# Patient Record
Sex: FEMALE | Race: WHITE | NOT HISPANIC OR LATINO | Employment: FULL TIME | RURAL
[De-identification: names, ages, dates, MRNs, and addresses within clinical notes are randomized per-mention and may not be internally consistent; named-entity substitution may affect disease eponyms.]

---

## 2019-01-28 ENCOUNTER — OFFICE VISIT (OUTPATIENT)
Dept: FAMILY MEDICINE CLINIC | Facility: CLINIC | Age: 24
End: 2019-01-28

## 2019-01-28 VITALS
DIASTOLIC BLOOD PRESSURE: 80 MMHG | HEIGHT: 64 IN | HEART RATE: 96 BPM | OXYGEN SATURATION: 98 % | RESPIRATION RATE: 18 BRPM | SYSTOLIC BLOOD PRESSURE: 123 MMHG | WEIGHT: 105 LBS | BODY MASS INDEX: 17.93 KG/M2

## 2019-01-28 DIAGNOSIS — R53.83 FATIGUE, UNSPECIFIED TYPE: Primary | ICD-10-CM

## 2019-01-28 DIAGNOSIS — F41.1 GENERALIZED ANXIETY DISORDER: ICD-10-CM

## 2019-01-28 PROCEDURE — 99212 OFFICE O/P EST SF 10 MIN: CPT | Performed by: NURSE PRACTITIONER

## 2019-01-28 RX ORDER — ETONOGESTREL/ETHINYL ESTRADIOL .12-.015MG
RING, VAGINAL VAGINAL
COMMUNITY
Start: 2019-01-03 | End: 2019-12-09

## 2019-01-28 RX ORDER — BUSPIRONE HYDROCHLORIDE 10 MG/1
TABLET ORAL
Qty: 60 TABLET | Refills: 2 | Status: SHIPPED | OUTPATIENT
Start: 2019-01-28 | End: 2019-12-09 | Stop reason: SDUPTHER

## 2019-01-28 NOTE — PROGRESS NOTES
Chief Complaint   Patient presents with   • Anxiety        Subjective   Radha Fernandes is a 23 y.o.  female who presents today for anxiety concerns.    HPI:  She has a long standing history of anxiety and she has seen a counselor in the past with good results.  She is now accepting a  position in [employer] and she has had an increase in anxiety related to this.  She is concerned and feels that she may need some medication to help her with this adjustment.    Radha Fernandes  has no past medical history on file.    No Known Allergies    Current Outpatient Medications:   •  NUVARING 0.12-0.015 MG/24HR vaginal ring, , Disp: , Rfl:   History reviewed. No pertinent past medical history.  History reviewed. No pertinent surgical history.  Social History     Socioeconomic History   • Marital status: Single     Spouse name: Not on file   • Number of children: Not on file   • Years of education: Not on file   • Highest education level: Not on file   Tobacco Use   • Smoking status: Never Smoker   • Smokeless tobacco: Never Used   Substance and Sexual Activity   • Alcohol use: Yes     Comment: 2 drinks a week   • Drug use: No   • Sexual activity: Defer     Family History   Problem Relation Age of Onset   • Hypertension Mother    • Depression Mother    • Anxiety disorder Mother    • No Known Problems Father        Family history, surgical history, past medical history, Allergies and med's reviewed with patient today and updated in [employer] EMR.     ROS:  Review of Systems   Constitutional: Positive for fatigue.   HENT: Negative.    Eyes: Negative.    Respiratory: Negative.    Cardiovascular: Negative.    Gastrointestinal: Negative.    Endocrine: Negative.    Genitourinary: Negative.    Musculoskeletal: Negative.    Skin: Negative.    Allergic/Immunologic: Negative.    Neurological: Negative.    Hematological: Negative.    Psychiatric/Behavioral: The patient is nervous/anxious.        OBJECTIVE:  Vitals:  "   01/28/19 1513   BP: 123/80   BP Location: Left arm   Patient Position: Sitting   Cuff Size: Adult   Pulse: 96   Resp: 18   SpO2: 98%   Weight: 47.6 kg (105 lb)   Height: 162.6 cm (64\")     Physical Exam   Constitutional: She is oriented to person, place, and time. She appears well-developed and well-nourished.   HENT:   Head: Normocephalic and atraumatic.   Right Ear: External ear normal.   Left Ear: External ear normal.   Nose: Nose normal.   Mouth/Throat: Oropharynx is clear and moist.   Eyes: Conjunctivae and EOM are normal. Pupils are equal, round, and reactive to light.   Neck: Normal range of motion. Neck supple.   Cardiovascular: Normal rate, regular rhythm, normal heart sounds and intact distal pulses.   Pulmonary/Chest: Effort normal and breath sounds normal.   Abdominal: Soft. Bowel sounds are normal.   Musculoskeletal: Normal range of motion.   Neurological: She is alert and oriented to person, place, and time.   Skin: Skin is warm and dry.   Psychiatric: She has a normal mood and affect. Her behavior is normal. Judgment and thought content normal.   Nursing note and vitals reviewed.      ASSESSMENT/ PLAN:    Radha was seen today for anxiety.    Diagnoses and all orders for this visit:    Fatigue, unspecified type    Generalized anxiety disorder  -     busPIRone (BUSPAR) 10 MG tablet; Take 1/2 to 1 tablet TID PRN anxiety        Orders Placed Today:     New Medications Ordered This Visit   Medications   • busPIRone (BUSPAR) 10 MG tablet     Sig: Take 1/2 to 1 tablet TID PRN anxiety     Dispense:  60 tablet     Refill:  2        Management Plan:     An After Visit Summary was printed and given to the patient at discharge.    Follow-up: Return if symptoms worsen or fail to improve.    Radha Jaimes, APRN 1/28/2019 3:41 PM  This note was electronically signed.          "

## 2019-09-20 ENCOUNTER — APPOINTMENT (OUTPATIENT)
Dept: GENERAL RADIOLOGY | Facility: HOSPITAL | Age: 24
End: 2019-09-20

## 2019-09-20 ENCOUNTER — HOSPITAL ENCOUNTER (EMERGENCY)
Facility: HOSPITAL | Age: 24
Discharge: HOME | End: 2019-09-20
Attending: EMERGENCY MEDICINE | Admitting: EMERGENCY MEDICINE

## 2019-09-20 VITALS
DIASTOLIC BLOOD PRESSURE: 69 MMHG | HEART RATE: 69 BPM | RESPIRATION RATE: 14 BRPM | HEIGHT: 64 IN | BODY MASS INDEX: 16.73 KG/M2 | WEIGHT: 98 LBS | TEMPERATURE: 98 F | SYSTOLIC BLOOD PRESSURE: 114 MMHG | OXYGEN SATURATION: 99 %

## 2019-09-20 DIAGNOSIS — J40 BRONCHITIS: Primary | ICD-10-CM

## 2019-09-20 LAB
ALBUMIN SERPL-MCNC: 5.4 G/DL (ref 3.5–5.2)
ALBUMIN/GLOB SERPL: 1.7 G/DL
ALP SERPL-CCNC: 96 U/L (ref 39–117)
ALT SERPL W P-5'-P-CCNC: 9 U/L (ref 1–33)
ANION GAP SERPL CALCULATED.3IONS-SCNC: 19 MMOL/L (ref 5–15)
AST SERPL-CCNC: 15 U/L (ref 1–32)
BASOPHILS # BLD AUTO: 0.04 10*3/MM3 (ref 0–0.2)
BASOPHILS NFR BLD AUTO: 0.4 % (ref 0–1.5)
BILIRUB SERPL-MCNC: 0.7 MG/DL (ref 0.2–1.2)
BUN BLD-MCNC: 10 MG/DL (ref 6–20)
BUN/CREAT SERPL: 17.5 (ref 7–25)
CALCIUM SPEC-SCNC: 9.9 MG/DL (ref 8.6–10.5)
CHLORIDE SERPL-SCNC: 100 MMOL/L (ref 98–107)
CO2 SERPL-SCNC: 22 MMOL/L (ref 22–29)
CREAT BLD-MCNC: 0.57 MG/DL (ref 0.57–1)
D DIMER PPP FEU-MCNC: 0.42 MG/L (FEU) (ref 0–0.5)
D-LACTATE SERPL-SCNC: 0.8 MMOL/L (ref 0.5–2)
DEPRECATED RDW RBC AUTO: 38.5 FL (ref 37–54)
EOSINOPHIL # BLD AUTO: 0.05 10*3/MM3 (ref 0–0.4)
EOSINOPHIL NFR BLD AUTO: 0.5 % (ref 0.3–6.2)
ERYTHROCYTE [DISTWIDTH] IN BLOOD BY AUTOMATED COUNT: 12 % (ref 12.3–15.4)
GFR SERPL CREATININE-BSD FRML MDRD: 133 ML/MIN/1.73
GLOBULIN UR ELPH-MCNC: 3.2 GM/DL
GLUCOSE BLD-MCNC: 97 MG/DL (ref 65–99)
HCT VFR BLD AUTO: 42.6 % (ref 34–46.6)
HGB BLD-MCNC: 15 G/DL (ref 12–15.9)
IMM GRANULOCYTES # BLD AUTO: 0.02 10*3/MM3 (ref 0–0.05)
IMM GRANULOCYTES NFR BLD AUTO: 0.2 % (ref 0–0.5)
LYMPHOCYTES # BLD AUTO: 2.27 10*3/MM3 (ref 0.7–3.1)
LYMPHOCYTES NFR BLD AUTO: 22.2 % (ref 19.6–45.3)
MCH RBC QN AUTO: 31.1 PG (ref 26.6–33)
MCHC RBC AUTO-ENTMCNC: 35.2 G/DL (ref 31.5–35.7)
MCV RBC AUTO: 88.2 FL (ref 79–97)
MONOCYTES # BLD AUTO: 0.62 10*3/MM3 (ref 0.1–0.9)
MONOCYTES NFR BLD AUTO: 6.1 % (ref 5–12)
NEUTROPHILS # BLD AUTO: 7.21 10*3/MM3 (ref 1.7–7)
NEUTROPHILS NFR BLD AUTO: 70.6 % (ref 42.7–76)
NRBC BLD AUTO-RTO: 0 /100 WBC (ref 0–0.2)
PLATELET # BLD AUTO: 455 10*3/MM3 (ref 140–450)
PMV BLD AUTO: 9.7 FL (ref 6–12)
POTASSIUM BLD-SCNC: 3.6 MMOL/L (ref 3.5–5.2)
PROT SERPL-MCNC: 8.6 G/DL (ref 6–8.5)
RBC # BLD AUTO: 4.83 10*6/MM3 (ref 3.77–5.28)
SODIUM BLD-SCNC: 141 MMOL/L (ref 136–145)
WBC NRBC COR # BLD: 10.21 10*3/MM3 (ref 3.4–10.8)

## 2019-09-20 PROCEDURE — 87040 BLOOD CULTURE FOR BACTERIA: CPT | Performed by: EMERGENCY MEDICINE

## 2019-09-20 PROCEDURE — 85025 COMPLETE CBC W/AUTO DIFF WBC: CPT | Performed by: EMERGENCY MEDICINE

## 2019-09-20 PROCEDURE — 71046 X-RAY EXAM CHEST 2 VIEWS: CPT

## 2019-09-20 PROCEDURE — 99284 EMERGENCY DEPT VISIT MOD MDM: CPT

## 2019-09-20 PROCEDURE — 80053 COMPREHEN METABOLIC PANEL: CPT | Performed by: EMERGENCY MEDICINE

## 2019-09-20 PROCEDURE — 83605 ASSAY OF LACTIC ACID: CPT | Performed by: EMERGENCY MEDICINE

## 2019-09-20 PROCEDURE — 85379 FIBRIN DEGRADATION QUANT: CPT | Performed by: EMERGENCY MEDICINE

## 2019-09-20 RX ORDER — AZITHROMYCIN 250 MG/1
250 TABLET, FILM COATED ORAL DAILY
COMMUNITY
End: 2019-12-09

## 2019-09-20 RX ORDER — SODIUM CHLORIDE 0.9 % (FLUSH) 0.9 %
10 SYRINGE (ML) INJECTION AS NEEDED
Status: DISCONTINUED | OUTPATIENT
Start: 2019-09-20 | End: 2019-09-21 | Stop reason: HOSPADM

## 2019-09-20 RX ADMIN — SODIUM CHLORIDE 1000 ML: 9 INJECTION, SOLUTION INTRAVENOUS at 21:05

## 2019-09-21 NOTE — ED PROVIDER NOTES
Subjective   Patient complains of cough and congestion is been going on for about a week.  She says the cough is nonproductive but is deep and hacking.  She does not know of any definite fever but has felt chilled.  She has had some vomiting with this also.  She aches all over and generally feels bad.  She was seen at [facility] and diagnosed with bronchitis and put on a Z-Jamshid but has not gotten better and actually seems to gotten worse.  She says they did give her a shot of steroids and that seemed to help a little while but now she is feeling worse.  She is worried because she has vape THC over the last month or so.  She was not aware of the alerts about there being problems with that until now.  She has lost about 8 pounds also.  She had one syncopal episode earlier in the week of the restroom at [facility] but does not really know what precipitated it.  She is very scared more to be checked out.        History provided by:  Patient   used: No    Cough   Cough characteristics:  Dry  Sputum characteristics:  Unable to specify  Severity:  Moderate  Onset quality:  Gradual  Duration:  1 week  Timing:  Intermittent  Progression:  Unchanged  Chronicity:  New  Smoker: no (vapes)    Context: upper respiratory infection    Context: not animal exposure, not exposure to allergens, not fumes, not occupational exposure, not sick contacts, not smoke exposure, not weather changes and not with activity    Relieved by:  Nothing  Worsened by:  Nothing  Ineffective treatments: Antibiotics.  Associated symptoms: chest pain, chills, myalgias, shortness of breath and weight loss    Associated symptoms: no diaphoresis, no ear fullness, no ear pain, no eye discharge, no fever, no headaches, no rash, no rhinorrhea, no sinus congestion, no sore throat and no wheezing    Risk factors: no chemical exposure, no recent infection and no recent travel        Review of Systems   Constitutional: Positive for chills and  weight loss. Negative for diaphoresis and fever.   HENT: Negative for ear pain, rhinorrhea and sore throat.    Eyes: Negative.  Negative for discharge.   Respiratory: Positive for cough and shortness of breath. Negative for wheezing.    Cardiovascular: Positive for chest pain.   Gastrointestinal: Negative.    Genitourinary: Negative.    Musculoskeletal: Positive for myalgias.   Skin: Negative.  Negative for rash.   Neurological: Negative.  Negative for headaches.   Psychiatric/Behavioral: Negative.    All other systems reviewed and are negative.      Past Medical History:   Diagnosis Date   • Bronchitis        No Known Allergies    History reviewed. No pertinent surgical history.    Family History   Problem Relation Age of Onset   • Hypertension Mother    • Depression Mother    • Anxiety disorder Mother    • No Known Problems Father        Social History     Socioeconomic History   • Marital status: Single     Spouse name: Not on file   • Number of children: Not on file   • Years of education: Not on file   • Highest education level: Not on file   Tobacco Use   • Smoking status: Never Smoker   • Smokeless tobacco: Never Used   Substance and Sexual Activity   • Alcohol use: Yes     Comment: 2 drinks a week   • Drug use: No   • Sexual activity: Defer       Prior to Admission medications    Medication Sig Start Date End Date Taking? Authorizing Provider   azithromycin (ZITHROMAX) 250 MG tablet Take 250 mg by mouth Daily. Take 2 tablets the first day, then 1 tablet daily for 4 days.   Yes Ashia Powell MD   busPIRone (BUSPAR) 10 MG tablet Take 1/2 to 1 tablet TID PRN anxiety 1/28/19   Radha Jaimes APRN   NUVARING 0.12-0.015 MG/24HR vaginal ring  1/3/19   ProviderAshia MD       Medications   sodium chloride 0.9 % flush 10 mL (not administered)   sodium chloride 0.9 % bolus 1,000 mL (0 mL Intravenous Stopped 9/20/19 2135)       Vitals:    09/20/19 2228   BP:    Pulse: 69   Resp:    Temp:    SpO2:  99%         Objective   Physical Exam   Constitutional: She is oriented to person, place, and time. She appears well-developed and well-nourished.   HENT:   Head: Normocephalic and atraumatic.   Mouth/Throat: Oropharynx is clear and moist.   Neck: Normal range of motion. Neck supple.   Cardiovascular: Normal rate and regular rhythm.   Pulmonary/Chest: Effort normal and breath sounds normal.   Abdominal: Soft. Bowel sounds are normal.   Musculoskeletal: Normal range of motion.   Neurological: She is alert and oriented to person, place, and time.   Skin: Skin is warm and dry. Capillary refill takes less than 2 seconds.   Psychiatric: She has a normal mood and affect. Her behavior is normal.   Nursing note and vitals reviewed.      Procedures         Lab Results (last 24 hours)     Procedure Component Value Units Date/Time    CBC & Differential [[id number]] Collected:  09/20/19 2100    Specimen:  Blood Updated:  09/20/19 2114    Narrative:       The following orders were created for panel order CBC & Differential.  Procedure                               Abnormality         Status                     ---------                               -----------         ------                     CBC Auto Differential[[id number]]         Abnormal            Final result                 Please view results for these tests on the individual orders.    Comprehensive Metabolic Panel [[id number]]  (Abnormal) Collected:  09/20/19 2100    Specimen:  Blood Updated:  09/20/19 2129     Glucose 97 mg/dL      BUN 10 mg/dL      Creatinine 0.57 mg/dL      Sodium 141 mmol/L      Potassium 3.6 mmol/L      Chloride 100 mmol/L      CO2 22.0 mmol/L      Calcium 9.9 mg/dL      Total Protein 8.6 g/dL      Albumin 5.40 g/dL      ALT (SGPT) 9 U/L      AST (SGOT) 15 U/L      Alkaline Phosphatase 96 U/L      Total Bilirubin 0.7 mg/dL      eGFR Non African Amer 133 mL/min/1.73      Globulin 3.2 gm/dL      A/G Ratio 1.7 g/dL      BUN/Creatinine Ratio 17.5      Anion Gap 19.0 mmol/L     Narrative:       GFR Normal >60  Chronic Kidney Disease <60  Kidney Failure <15    D-dimer, Quantitative [[id number]]  (Normal) Collected:  09/20/19 2100    Specimen:  Blood Updated:  09/20/19 2129     D-Dimer, Quantitative 0.42 mg/L (FEU)     Narrative:       Reference Range is 0-0.50 mg/L FEU. However, results <0.50 mg/L FEU tends to rule out DVT or PE. Results >0.50 mg/L FEU are not useful in predicting absence or presence of DVT or PE.    Lactic Acid, Plasma [[id number]]  (Normal) Collected:  09/20/19 2100    Specimen:  Blood Updated:  09/20/19 2126     Lactate 0.8 mmol/L     CBC Auto Differential [[id number]]  (Abnormal) Collected:  09/20/19 2100    Specimen:  Blood Updated:  09/20/19 2114     WBC 10.21 10*3/mm3      RBC 4.83 10*6/mm3      Hemoglobin 15.0 g/dL      Hematocrit 42.6 %      MCV 88.2 fL      MCH 31.1 pg      MCHC 35.2 g/dL      RDW 12.0 %      RDW-SD 38.5 fl      MPV 9.7 fL      Platelets 455 10*3/mm3      Neutrophil % 70.6 %      Lymphocyte % 22.2 %      Monocyte % 6.1 %      Eosinophil % 0.5 %      Basophil % 0.4 %      Immature Grans % 0.2 %      Neutrophils, Absolute 7.21 10*3/mm3      Lymphocytes, Absolute 2.27 10*3/mm3      Monocytes, Absolute 0.62 10*3/mm3      Eosinophils, Absolute 0.05 10*3/mm3      Basophils, Absolute 0.04 10*3/mm3      Immature Grans, Absolute 0.02 10*3/mm3      nRBC 0.0 /100 WBC     Blood Culture With JOSE LUIS - Blood, Arm, Right [[id number]] Collected:  09/20/19 2100    Specimen:  Blood from Arm, Right Updated:  09/20/19 2111          XR Chest 2 View   Final Result   1. No radiographic evidence of acute cardiopulmonary process.           This report was finalized on 09/20/2019 21:23 by Dr. Louis Gipson MD.          ED Course  ED Course as of Sep 21 0012   Sat Sep 21, 2019   0011 I told the patient all of her testing here today looked okay.  There is no signs of a preinjury anything right now.  This appears to be a viral bronchitis.  I did tell her if she  felt worse or things change to come back let us recheck her.  She is discharged in stable condition.  [TR]      ED Course User Index  [TR] Dov Butler Jr., MD          MDM  Number of Diagnoses or Management Options  Bronchitis: new and requires workup     Amount and/or Complexity of Data Reviewed  Clinical lab tests: ordered and reviewed  Tests in the radiology section of CPT®: ordered and reviewed  Tests in the medicine section of CPT®: ordered and reviewed    Risk of Complications, Morbidity, and/or Mortality  Presenting problems: moderate  Diagnostic procedures: moderate  Management options: moderate    Patient Progress  Patient progress: stable      Final diagnoses:   Bronchitis          Dov Butler Jr., MD  09/21/19 0012

## 2019-09-25 LAB — BACTERIA SPEC AEROBE CULT: NORMAL

## 2019-12-09 ENCOUNTER — OFFICE VISIT (OUTPATIENT)
Dept: FAMILY MEDICINE CLINIC | Facility: CLINIC | Age: 24
End: 2019-12-09

## 2019-12-09 VITALS
SYSTOLIC BLOOD PRESSURE: 120 MMHG | HEIGHT: 64 IN | DIASTOLIC BLOOD PRESSURE: 82 MMHG | BODY MASS INDEX: 18.3 KG/M2 | OXYGEN SATURATION: 96 % | HEART RATE: 91 BPM | WEIGHT: 107.2 LBS

## 2019-12-09 DIAGNOSIS — F41.1 GENERALIZED ANXIETY DISORDER: ICD-10-CM

## 2019-12-09 DIAGNOSIS — Z30.011 ENCOUNTER FOR INITIAL PRESCRIPTION OF CONTRACEPTIVE PILLS: Primary | ICD-10-CM

## 2019-12-09 DIAGNOSIS — R63.5 WEIGHT GAIN: ICD-10-CM

## 2019-12-09 PROBLEM — F41.9 ANXIETY: Status: ACTIVE | Noted: 2019-12-09

## 2019-12-09 LAB
B-HCG UR QL: NEGATIVE
INTERNAL NEGATIVE CONTROL: NEGATIVE
INTERNAL POSITIVE CONTROL: POSITIVE
Lab: NORMAL

## 2019-12-09 PROCEDURE — 81025 URINE PREGNANCY TEST: CPT | Performed by: NURSE PRACTITIONER

## 2019-12-09 PROCEDURE — 99213 OFFICE O/P EST LOW 20 MIN: CPT | Performed by: NURSE PRACTITIONER

## 2019-12-09 RX ORDER — BUSPIRONE HYDROCHLORIDE 10 MG/1
TABLET ORAL
Qty: 60 TABLET | Refills: 2 | Status: SHIPPED | OUTPATIENT
Start: 2019-12-09 | End: 2021-05-06

## 2019-12-09 RX ORDER — CITALOPRAM 10 MG/1
10 TABLET ORAL DAILY
Qty: 30 TABLET | Refills: 5 | Status: SHIPPED | OUTPATIENT
Start: 2019-12-09 | End: 2020-07-24

## 2019-12-09 RX ORDER — NAPROXEN 500 MG/1
500 TABLET ORAL 2 TIMES DAILY
Refills: 0 | COMMUNITY
Start: 2019-11-06 | End: 2020-07-24

## 2019-12-09 RX ORDER — DROSPIRENONE AND ETHINYL ESTRADIOL 0.02-3(28)
1 KIT ORAL DAILY
Qty: 28 TABLET | Refills: 12 | Status: SHIPPED | OUTPATIENT
Start: 2019-12-09 | End: 2020-07-24

## 2019-12-09 NOTE — PROGRESS NOTES
Chief Complaint   Patient presents with   • Anxiety     recheck   • medication review        Subjective   Radha Fernandes is a 24 y.o.  female who presents today for medication review/anxiety.    HPI:  ANXIETY:  She states that at the beginning she only had social anxiety.  Now, she is to the point that she is having panic attacks.  The Buspar still works for her but she states it does not hold her until the next episode and she feels that it isn't strong enough.  BCP:  Patient no longer has the Nuvaring and wishes to restart oral contraceptives.  She states she used to take them but was not responsible enough to take them at the same time every day.  She feels that she would prefer this treatment method now.  LMP:  11/23/2019    Radha Fernandes  has a past medical history of Bronchitis.    No Known Allergies    Current Outpatient Medications:   •  busPIRone (BUSPAR) 10 MG tablet, Take 1/2 to 1 tablet TID PRN anxiety, Disp: 60 tablet, Rfl: 2  •  naproxen (NAPROSYN) 500 MG tablet, Take 500 mg by mouth 2 (Two) Times a Day., Disp: , Rfl: 0  •  citalopram (CeleXA) 10 MG tablet, Take 1 tablet by mouth Daily., Disp: 30 tablet, Rfl: 5  •  drospirenone-ethinyl estradiol (STANLEY,GIANVI) 3-0.02 MG per tablet, Take 1 tablet by mouth Daily., Disp: 28 tablet, Rfl: 12  Past Medical History:   Diagnosis Date   • Bronchitis      History reviewed. No pertinent surgical history.  Social History     Socioeconomic History   • Marital status: Single     Spouse name: Not on file   • Number of children: Not on file   • Years of education: Not on file   • Highest education level: Not on file   Tobacco Use   • Smoking status: Never Smoker   • Smokeless tobacco: Never Used   Substance and Sexual Activity   • Alcohol use: Yes     Comment: 2 drinks a week   • Drug use: No   • Sexual activity: Defer     Family History   Problem Relation Age of Onset   • Hypertension Mother    • Depression Mother    • Anxiety disorder Mother    • No Known  "Problems Father        Family history, surgical history, past medical history, Allergies and med's reviewed with patient today and updated in [facility] EMR.     ROS:  Review of Systems   Constitutional: Negative.  Negative for fatigue, fever and unexpected weight change.   HENT: Negative.  Negative for facial swelling, sore throat and trouble swallowing.    Eyes: Negative.  Negative for photophobia, discharge and visual disturbance.   Respiratory: Negative.  Negative for cough, chest tightness and shortness of breath.    Cardiovascular: Negative.  Negative for chest pain and palpitations.   Gastrointestinal: Negative.  Negative for abdominal pain, diarrhea, nausea and vomiting.   Endocrine: Negative.  Negative for polydipsia, polyphagia and polyuria.   Genitourinary: Negative.  Negative for dysuria, flank pain and frequency.   Musculoskeletal: Negative.  Negative for back pain, gait problem and neck pain.   Skin: Negative.  Negative for rash.   Allergic/Immunologic: Negative.    Neurological: Negative.  Negative for dizziness, light-headedness and headaches.   Hematological: Negative.    Psychiatric/Behavioral: Negative for self-injury and suicidal ideas. The patient is nervous/anxious.        OBJECTIVE:  Vitals:    12/09/19 1135   BP: 120/82   BP Location: Left arm   Patient Position: Sitting   Cuff Size: Adult   Pulse: 91   SpO2: 96%   Weight: 48.6 kg (107 lb 3.2 oz)   Height: 162.6 cm (64\")     Physical Exam   Constitutional: She is oriented to person, place, and time. She appears well-developed and well-nourished. No distress.   HENT:   Head: Normocephalic and atraumatic.   Eyes: Pupils are equal, round, and reactive to light. Conjunctivae and EOM are normal.   Neck: Normal range of motion. Neck supple.   Cardiovascular: Normal rate, regular rhythm, normal heart sounds and intact distal pulses.   No murmur heard.  Pulmonary/Chest: Effort normal and breath sounds normal. No respiratory distress.   Abdominal: Soft. " Bowel sounds are normal. She exhibits no distension. There is no tenderness.   Musculoskeletal: Normal range of motion. She exhibits no edema.   Neurological: She is alert and oriented to person, place, and time.   Skin: Skin is warm and dry. Capillary refill takes less than 2 seconds. She is not diaphoretic. No erythema.   Psychiatric: She has a normal mood and affect. Her behavior is normal. Judgment and thought content normal.   Nursing note and vitals reviewed.      ASSESSMENT/ PLAN:    Radha was seen today for anxiety and medication review.    Diagnoses and all orders for this visit:    Encounter for initial prescription of contraceptive pills  -     POCT pregnancy, urine  -     drospirenone-ethinyl estradiol (STANLEY,GIANVI) 3-0.02 MG per tablet; Take 1 tablet by mouth Daily.    Generalized anxiety disorder  -     citalopram (CeleXA) 10 MG tablet; Take 1 tablet by mouth Daily.  -     busPIRone (BUSPAR) 10 MG tablet; Take 1/2 to 1 tablet TID PRN anxiety    Weight gain        Orders Placed Today:     New Medications Ordered This Visit   Medications   • citalopram (CeleXA) 10 MG tablet     Sig: Take 1 tablet by mouth Daily.     Dispense:  30 tablet     Refill:  5   • busPIRone (BUSPAR) 10 MG tablet     Sig: Take 1/2 to 1 tablet TID PRN anxiety     Dispense:  60 tablet     Refill:  2   • drospirenone-ethinyl estradiol (STANLEY,GIANVI) 3-0.02 MG per tablet     Sig: Take 1 tablet by mouth Daily.     Dispense:  28 tablet     Refill:  12        Management Plan:     An After Visit Summary was printed and given to the patient at discharge.    Follow-up: Return in about 6 months (around 6/9/2020) for Next scheduled follow up.    Radha Jaimes, APRN 12/9/2019 12:03 PM  This note was electronically signed.

## 2020-07-24 ENCOUNTER — HOSPITAL ENCOUNTER (OUTPATIENT)
Dept: GENERAL RADIOLOGY | Facility: HOSPITAL | Age: 25
Discharge: HOME | End: 2020-07-24
Admitting: NURSE PRACTITIONER

## 2020-07-24 PROCEDURE — U0003 INFECTIOUS AGENT DETECTION BY NUCLEIC ACID (DNA OR RNA); SEVERE ACUTE RESPIRATORY SYNDROME CORONAVIRUS 2 (SARS-COV-2) (CORONAVIRUS DISEASE [COVID-19]), AMPLIFIED PROBE TECHNIQUE, MAKING USE OF HIGH THROUGHPUT TECHNOLOGIES AS DESCRIBED BY CMS-2020-01-R: HCPCS | Performed by: NURSE PRACTITIONER

## 2020-07-24 PROCEDURE — 71046 X-RAY EXAM CHEST 2 VIEWS: CPT

## 2020-07-28 ENCOUNTER — TELEMEDICINE (OUTPATIENT)
Dept: FAMILY MEDICINE CLINIC | Facility: CLINIC | Age: 25
End: 2020-07-28

## 2020-07-28 DIAGNOSIS — R53.83 FATIGUE, UNSPECIFIED TYPE: ICD-10-CM

## 2020-07-28 DIAGNOSIS — Z01.84 COVID-19 VIRUS ANTIBODY NEGATIVE: ICD-10-CM

## 2020-07-28 DIAGNOSIS — J45.21 MILD INTERMITTENT ASTHMATIC BRONCHITIS WITH ACUTE EXACERBATION: Primary | ICD-10-CM

## 2020-07-28 DIAGNOSIS — R05.9 COUGH: ICD-10-CM

## 2020-07-28 PROCEDURE — 99213 OFFICE O/P EST LOW 20 MIN: CPT | Performed by: NURSE PRACTITIONER

## 2020-07-28 RX ORDER — ALBUTEROL SULFATE 90 UG/1
2 AEROSOL, METERED RESPIRATORY (INHALATION) EVERY 4 HOURS PRN
Qty: 1 INHALER | Refills: 2 | Status: SHIPPED | OUTPATIENT
Start: 2020-07-28 | End: 2022-02-25

## 2020-07-28 RX ORDER — ALBUTEROL SULFATE 2.5 MG/3ML
2.5 SOLUTION RESPIRATORY (INHALATION) EVERY 4 HOURS PRN
Qty: 100 VIAL | Refills: 0 | Status: SHIPPED | OUTPATIENT
Start: 2020-07-28 | End: 2022-02-25

## 2020-07-28 RX ORDER — METHYLPREDNISOLONE 4 MG/1
TABLET ORAL
Qty: 1 EACH | Refills: 0 | Status: SHIPPED | OUTPATIENT
Start: 2020-07-28 | End: 2021-02-23

## 2020-07-28 NOTE — PROGRESS NOTES
"Chief Complaint   Patient presents with   • Cough     Covid 19 negative        Subjective   Radha Fernandes is a 25 y.o.  female who presents today for persistent cough.    You have chosen to receive care through a telehealth visit.  Do you consent to use a video/audio connection for your medical care today? Yes    HPI:  Patient was seen 7/24/20 at  in [facility].  She received swab for Covid, flu and strep, all negative.  She received a steroid shot.  Since then, she no longer has a fever but the cough makes her short of breath.  \"I sometimes feel like I can't breathe.\"  Overall patient admits to feeling better other than the continued shortness of breath that is limiting.    Radha Fernandes  has a past medical history of Bronchitis.    No Known Allergies    Current Outpatient Medications:   •  albuterol (PROVENTIL) (2.5 MG/3ML) 0.083% nebulizer solution, Take 2.5 mg by nebulization Every 4 (Four) Hours As Needed for Wheezing or Shortness of Air., Disp: 100 vial, Rfl: 0  •  albuterol sulfate  (90 Base) MCG/ACT inhaler, Inhale 2 puffs Every 4 (Four) Hours As Needed for Wheezing or Shortness of Air., Disp: 1 inhaler, Rfl: 2  •  busPIRone (BUSPAR) 10 MG tablet, Take 1/2 to 1 tablet TID PRN anxiety, Disp: 60 tablet, Rfl: 2  •  methylPREDNISolone (MEDROL, CHARY,) 4 MG tablet, Take as directed on package instructions., Disp: 1 each, Rfl: 0  Past Medical History:   Diagnosis Date   • Bronchitis      History reviewed. No pertinent surgical history.  Social History     Socioeconomic History   • Marital status: Single     Spouse name: Not on file   • Number of children: Not on file   • Years of education: Not on file   • Highest education level: Not on file   Tobacco Use   • Smoking status: Never Smoker   • Smokeless tobacco: Never Used   Substance and Sexual Activity   • Alcohol use: Yes     Comment: 2 drinks a week   • Drug use: No   • Sexual activity: Defer     Family History   Problem Relation Age of Onset   • " Hypertension Mother    • Depression Mother    • Anxiety disorder Mother    • No Known Problems Father        Family history, surgical history, past medical history, Allergies and med's reviewed with patient today and updated in [facility] EMR.     ROS:  Review of Systems   Constitutional: Positive for fatigue. Negative for fever and unexpected weight change.   HENT: Negative.  Negative for facial swelling, sore throat and trouble swallowing.    Eyes: Negative.  Negative for photophobia, discharge and visual disturbance.   Respiratory: Positive for cough, shortness of breath and wheezing. Negative for chest tightness.    Cardiovascular: Negative.  Negative for chest pain and palpitations.   Gastrointestinal: Negative.  Negative for abdominal pain, diarrhea, nausea and vomiting.   Endocrine: Negative.  Negative for polydipsia, polyphagia and polyuria.   Genitourinary: Negative.  Negative for dysuria, flank pain and frequency.   Musculoskeletal: Negative.  Negative for back pain, gait problem and neck pain.   Skin: Negative.  Negative for rash.   Allergic/Immunologic: Negative.    Neurological: Negative.  Negative for dizziness, light-headedness and headaches.   Hematological: Negative.    Psychiatric/Behavioral: Negative.  Negative for self-injury and suicidal ideas.       OBJECTIVE:  There were no vitals filed for this visit.  Physical Exam  No true physical exam completed.  Patient was alert and normally responsive.  No noted dyspnea or cough during encounter.    ASSESSMENT/ PLAN:    Radha was seen today for cough.    Diagnoses and all orders for this visit:    Mild intermittent asthmatic bronchitis with acute exacerbation  -     methylPREDNISolone (MEDROL, CHARY,) 4 MG tablet; Take as directed on package instructions.  -     albuterol sulfate  (90 Base) MCG/ACT inhaler; Inhale 2 puffs Every 4 (Four) Hours As Needed for Wheezing or Shortness of Air.  -     albuterol (PROVENTIL) (2.5 MG/3ML) 0.083% nebulizer solution;  Take 2.5 mg by nebulization Every 4 (Four) Hours As Needed for Wheezing or Shortness of Air.  -     Home Nebulizer    Cough    Fatigue, unspecified type    COVID-19 virus antibody negative        Orders Placed Today:     New Medications Ordered This Visit   Medications   • methylPREDNISolone (MEDROL, CAHRY,) 4 MG tablet     Sig: Take as directed on package instructions.     Dispense:  1 each     Refill:  0   • albuterol sulfate  (90 Base) MCG/ACT inhaler     Sig: Inhale 2 puffs Every 4 (Four) Hours As Needed for Wheezing or Shortness of Air.     Dispense:  1 inhaler     Refill:  2   • albuterol (PROVENTIL) (2.5 MG/3ML) 0.083% nebulizer solution     Sig: Take 2.5 mg by nebulization Every 4 (Four) Hours As Needed for Wheezing or Shortness of Air.     Dispense:  100 vial     Refill:  0     Covid 19 negative.        Management Plan:     An After Visit Summary was printed and given to the patient at discharge.    Time spent with patient was 15 minutes.    Follow-up: Return if symptoms worsen or fail to improve.    Radha Jaimes, APRN 7/28/2020 15:57  This note was electronically signed.

## 2021-02-23 ENCOUNTER — OFFICE VISIT (OUTPATIENT)
Dept: FAMILY MEDICINE CLINIC | Facility: CLINIC | Age: 26
End: 2021-02-23

## 2021-02-23 VITALS
OXYGEN SATURATION: 97 % | TEMPERATURE: 97.8 F | HEIGHT: 64 IN | BODY MASS INDEX: 18.1 KG/M2 | SYSTOLIC BLOOD PRESSURE: 120 MMHG | WEIGHT: 106 LBS | DIASTOLIC BLOOD PRESSURE: 84 MMHG | HEART RATE: 108 BPM

## 2021-02-23 DIAGNOSIS — Z78.9 VEGAN DIET: ICD-10-CM

## 2021-02-23 DIAGNOSIS — F41.9 ANXIETY: ICD-10-CM

## 2021-02-23 DIAGNOSIS — R53.83 FATIGUE, UNSPECIFIED TYPE: Primary | ICD-10-CM

## 2021-02-23 DIAGNOSIS — R00.0 TACHYCARDIA: ICD-10-CM

## 2021-02-23 PROCEDURE — 99214 OFFICE O/P EST MOD 30 MIN: CPT | Performed by: NURSE PRACTITIONER

## 2021-02-23 NOTE — PROGRESS NOTES
"Chief Complaint  Anxiety and Anorexia    Subjective          Radha Fernandes presents to [facility]  History of Present Illness  Patient admits to feeling lots of nausea lately and not eating with any regularity.  The more she doesn't eat, the more nausea and rapid heartbeat she feels.  She did note that yesterday she forced herself to eat throughout the day and seemed to feel much better.  She eats a vegan diet so she feels her choices are restricted.  She does not take any supplements.  She notes a frequent racing heart beat.  She is unsure if this is related to above or anxiety.  She is unsure if she feels anxious or not.     Objective   Vital Signs:   /84 (BP Location: Left arm, Patient Position: Sitting, Cuff Size: Adult)   Pulse 108   Temp 97.8 °F (36.6 °C)   Ht 162.6 cm (64\") Comment: pt reported  Wt 48.1 kg (106 lb)   SpO2 97%   BMI 18.19 kg/m²     Physical Exam  Constitutional:       General: She is not in acute distress.     Appearance: Normal appearance. She is well-developed. She is not diaphoretic.      Comments: Thin.   HENT:      Head: Normocephalic and atraumatic.   Eyes:      Conjunctiva/sclera: Conjunctivae normal.      Pupils: Pupils are equal, round, and reactive to light.   Neck:      Musculoskeletal: Normal range of motion and neck supple.   Cardiovascular:      Rate and Rhythm: Regular rhythm. Tachycardia present.      Heart sounds: Normal heart sounds. No murmur.   Pulmonary:      Effort: Pulmonary effort is normal. No respiratory distress.      Breath sounds: Normal breath sounds.   Abdominal:      General: Bowel sounds are normal. There is no distension.      Palpations: Abdomen is soft.      Tenderness: There is no abdominal tenderness.   Musculoskeletal: Normal range of motion.   Skin:     General: Skin is warm and dry.      Capillary Refill: Capillary refill takes less than 2 seconds.      Findings: No erythema.   Neurological:      General: No " focal deficit present.      Mental Status: She is alert and oriented to person, place, and time. Mental status is at baseline.   Psychiatric:         Mood and Affect: Mood normal.         Behavior: Behavior normal.         Thought Content: Thought content normal.         Judgment: Judgment normal.        Result Review :                 Assessment and Plan    Diagnoses and all orders for this visit:    1. Fatigue, unspecified type (Primary)  -     Comprehensive metabolic panel; Future  -     Lipid Panel With LDL/HDL Ratio; Future  -     T4; Future  -     TSH; Future  -     Vitamin B12; Future  -     Vitamin D 25 hydroxy; Future  -     CBC w AUTO Differential; Future  -     Iron and TIBC; Future  -     Ferritin; Future  -     Folate; Future    2. Tachycardia  -     Comprehensive metabolic panel; Future  -     Lipid Panel With LDL/HDL Ratio; Future  -     T4; Future  -     TSH; Future  -     Vitamin B12; Future  -     Vitamin D 25 hydroxy; Future  -     CBC w AUTO Differential; Future  -     Iron and TIBC; Future  -     Ferritin; Future  -     Folate; Future    3. Anxiety    4. Vegan diet  -     Vitamin B12; Future  -     CBC w AUTO Differential; Future  -     Iron and TIBC; Future  -     Ferritin; Future  -     Folate; Future    Further recommendations based on lab results.    Follow Up   Return in about 17 days (around 3/12/2021) for Annual physical with labs prior ( orders to patient ).  Patient was given instructions and counseling regarding her condition or for health maintenance advice. Please see specific information pulled into the AVS if appropriate.

## 2021-03-19 ENCOUNTER — TELEMEDICINE (OUTPATIENT)
Dept: FAMILY MEDICINE CLINIC | Facility: CLINIC | Age: 26
End: 2021-03-19

## 2021-03-19 DIAGNOSIS — R53.83 FATIGUE, UNSPECIFIED TYPE: ICD-10-CM

## 2021-03-19 DIAGNOSIS — E55.9 VITAMIN D DEFICIENCY: Primary | ICD-10-CM

## 2021-03-19 DIAGNOSIS — F41.9 ANXIETY: ICD-10-CM

## 2021-03-19 PROCEDURE — 99442 PR PHYS/QHP TELEPHONE EVALUATION 11-20 MIN: CPT | Performed by: NURSE PRACTITIONER

## 2021-03-19 RX ORDER — VENLAFAXINE HYDROCHLORIDE 75 MG/1
75 CAPSULE, EXTENDED RELEASE ORAL DAILY
Qty: 30 CAPSULE | Refills: 5 | Status: SHIPPED | OUTPATIENT
Start: 2021-03-19 | End: 2021-03-29 | Stop reason: SINTOL

## 2021-03-19 NOTE — PROGRESS NOTES
Chief Complaint  Abnormal Lab (lab results)    Subjective          Radha Fernandes presents to [facility]     You have chosen to receive care through a telephone visit. Do you consent to use a telephone visit for your medical care today? Yes    History of Present Illness  Patient presents via telephone visit to discuss her lab results.  She was supposed to be scheduled for an office visit for annual wellness but cancelled due to college classes and scheduled a telephone encounter instead.      She continues to complain of both fatigue and anxiety.  She has tried Celexa with poor results.  She uses Buspar PRN but is interested in taking something daily/routinely.    Objective   Vital Signs:   There were no vitals taken for this visit.      Physical Exam   No physical exam in this telephonic encounter.    Result Review :                 Assessment and Plan    Diagnoses and all orders for this visit:    1. Vitamin D deficiency (Primary)    2. Anxiety  -     venlafaxine XR (Effexor XR) 75 MG 24 hr capsule; Take 1 capsule by mouth Daily.  Dispense: 30 capsule; Refill: 5    3. Fatigue, unspecified type    Patient encouraged to start Vitamin D supplement 2,000IU daily and make an effort to get more natural sunlight.    I spent 12 minutes caring for Radha on this date of service. This time includes time spent by me in the following activities:preparing for the visit, reviewing tests, obtaining and/or reviewing a separately obtained history, performing a medically appropriate examination and/or evaluation , counseling and educating the patient/family/caregiver, ordering medications, tests, or procedures and documenting information in the medical record  Follow Up   Return in about 6 months (around 9/19/2021) for Next scheduled follow up.  Patient was given instructions and counseling regarding her condition or for health maintenance advice. Please see specific information pulled into the AVS if  appropriate.

## 2021-03-29 ENCOUNTER — TELEPHONE (OUTPATIENT)
Dept: FAMILY MEDICINE CLINIC | Facility: CLINIC | Age: 26
End: 2021-03-29

## 2021-03-29 DIAGNOSIS — F41.9 ANXIETY: Primary | ICD-10-CM

## 2021-03-29 RX ORDER — DULOXETIN HYDROCHLORIDE 30 MG/1
30 CAPSULE, DELAYED RELEASE ORAL DAILY
Qty: 30 CAPSULE | Refills: 2 | Status: SHIPPED | OUTPATIENT
Start: 2021-03-29 | End: 2021-08-09

## 2021-03-29 NOTE — TELEPHONE ENCOUNTER
A new prescription for Cymbalta has been sent to [facility].  She has been on Effexor a short time so she can simply stop it without tapering off.  If this doesn't work, she needs an appointment to discuss.

## 2021-03-29 NOTE — TELEPHONE ENCOUNTER
Caller: Radha Fernandes    Relationship: Self    Best call back number: [phone]    What medications are you currently taking:   Current Outpatient Medications on File Prior to Visit   Medication Sig Dispense Refill   • albuterol (PROVENTIL) (2.5 MG/3ML) 0.083% nebulizer solution Take 2.5 mg by nebulization Every 4 (Four) Hours As Needed for Wheezing or Shortness of Air. 100 vial 0   • albuterol sulfate  (90 Base) MCG/ACT inhaler Inhale 2 puffs Every 4 (Four) Hours As Needed for Wheezing or Shortness of Air. 1 inhaler 2   • busPIRone (BUSPAR) 10 MG tablet Take 1/2 to 1 tablet TID PRN anxiety 60 tablet 2   • venlafaxine XR (Effexor XR) 75 MG 24 hr capsule Take 1 capsule by mouth Daily. 30 capsule 5     No current facility-administered medications on file prior to visit.        When did you start taking these medications: VENLAFAXINE XR STOPPED AFTER TAKING FOR 3 DAYS    Which medication are you concerned about: VENLAFAXINE XR    Who prescribed you this medication: AICHA HALL      NAUSEA, VERTIGO, AND DROWSINESS---PATIENT WOULD LIKE TO TRY ANOTHER MEDICATION, PLEASE CALL NEW MEDICATION INTO [facility] IN [place]

## 2021-05-06 DIAGNOSIS — F41.1 GENERALIZED ANXIETY DISORDER: ICD-10-CM

## 2021-05-06 RX ORDER — BUSPIRONE HYDROCHLORIDE 10 MG/1
TABLET ORAL
Qty: 60 TABLET | Refills: 0 | Status: SHIPPED | OUTPATIENT
Start: 2021-05-06 | End: 2022-02-25 | Stop reason: SDUPTHER

## 2021-07-06 ENCOUNTER — TELEPHONE (OUTPATIENT)
Dept: FAMILY MEDICINE CLINIC | Facility: CLINIC | Age: 26
End: 2021-07-06

## 2021-07-06 NOTE — TELEPHONE ENCOUNTER
Caller: Dena Radha    Relationship: Self    Best call back number: [phone]     What is the best time to reach you: ANYTIME    Who are you requesting to speak with (clinical staff, provider,  specific staff member): CLINICAL STAFF    What was the call regarding: PATIENT STATES SHE MISSED A DOSE OF DULoxetine (CYMBALTA) 30 MG capsule. PATIENT STATES THAT HER NEXT ONE IS NOT DUE FOR ANOTHER 12 HOURS. PATIENT WOULD LIKE TO KNOW IF SHE SHOULD JUST GO ON AND TAKE ANOTHER ONE OR WHAT SHE NEEDS TO DO.

## 2021-07-20 DIAGNOSIS — Z30.011 ENCOUNTER FOR INITIAL PRESCRIPTION OF CONTRACEPTIVE PILLS: ICD-10-CM

## 2021-07-20 RX ORDER — DROSPIRENONE AND ETHINYL ESTRADIOL 0.02-3(28)
KIT ORAL
Qty: 28 TABLET | Refills: 0 | Status: SHIPPED | OUTPATIENT
Start: 2021-07-20 | End: 2021-09-07

## 2021-08-09 DIAGNOSIS — F41.9 ANXIETY: ICD-10-CM

## 2021-08-09 RX ORDER — DULOXETIN HYDROCHLORIDE 30 MG/1
CAPSULE, DELAYED RELEASE ORAL
Qty: 30 CAPSULE | Refills: 5 | Status: SHIPPED | OUTPATIENT
Start: 2021-08-09 | End: 2022-02-11 | Stop reason: SDUPTHER

## 2021-09-07 DIAGNOSIS — Z30.011 ENCOUNTER FOR INITIAL PRESCRIPTION OF CONTRACEPTIVE PILLS: ICD-10-CM

## 2021-09-07 RX ORDER — DROSPIRENONE AND ETHINYL ESTRADIOL TABLETS 0.02-3(28)
KIT ORAL
Qty: 30 TABLET | Refills: 5 | Status: SHIPPED | OUTPATIENT
Start: 2021-09-07 | End: 2022-02-25

## 2022-02-09 DIAGNOSIS — F41.9 ANXIETY: ICD-10-CM

## 2022-02-10 NOTE — TELEPHONE ENCOUNTER
Rx Refill Note  Requested Prescriptions     Pending Prescriptions Disp Refills   • DULoxetine (CYMBALTA) 30 MG capsule [Pharmacy Med Name: DULoxetine HCl 30 MG Oral Capsule Delayed Release Particles] 30 capsule 0     Sig: Take 1 capsule by mouth once daily      Last office visit with prescribing clinician: 3/19/21  Next office visit with prescribing clinician: Visit date not found  -PLEASE CALL TO SCHEDULE PATIENT ANNUAL WITH LABS PRIOR. 30 DAY SUPPLY PENDED.    Adelaide Lr CMA  02/10/22, 12:44 CST

## 2022-02-11 DIAGNOSIS — F41.9 ANXIETY: ICD-10-CM

## 2022-02-11 DIAGNOSIS — E55.9 VITAMIN D DEFICIENCY: Primary | ICD-10-CM

## 2022-02-11 DIAGNOSIS — Z13.6 SCREENING FOR CARDIOVASCULAR CONDITION: ICD-10-CM

## 2022-02-11 DIAGNOSIS — R53.83 FATIGUE, UNSPECIFIED TYPE: ICD-10-CM

## 2022-02-11 DIAGNOSIS — Z00.00 ANNUAL PHYSICAL EXAM: ICD-10-CM

## 2022-02-11 NOTE — TELEPHONE ENCOUNTER
Rx Refill Note  Requested Prescriptions     Pending Prescriptions Disp Refills   • DULoxetine (CYMBALTA) 30 MG capsule 30 capsule 5     Sig: Take 1 capsule by mouth Daily.      Last office visit with prescribing clinician: 2/23/2021      Next office visit with prescribing clinician: 2/17/22  Last fill date on 8/9/21 for #30 with 5 refills   Yolette Doty MA  02/11/22, 16:53 CST

## 2022-02-14 RX ORDER — DULOXETIN HYDROCHLORIDE 30 MG/1
30 CAPSULE, DELAYED RELEASE ORAL DAILY
Qty: 30 CAPSULE | Refills: 5 | Status: SHIPPED | OUTPATIENT
Start: 2022-02-14 | End: 2022-02-15 | Stop reason: SDUPTHER

## 2022-02-15 RX ORDER — DULOXETIN HYDROCHLORIDE 30 MG/1
30 CAPSULE, DELAYED RELEASE ORAL DAILY
Qty: 90 CAPSULE | Refills: 0 | Status: SHIPPED | OUTPATIENT
Start: 2022-02-15 | End: 2022-02-21 | Stop reason: SDUPTHER

## 2022-02-15 NOTE — TELEPHONE ENCOUNTER
Caller: Radha Fernandes    Relationship: Self    Best call back number:  [phone] (H)     Requested Prescriptions:   Requested Prescriptions     Pending Prescriptions Disp Refills   • DULoxetine (CYMBALTA) 30 MG capsule 30 capsule 5     Sig: Take 1 capsule by mouth Daily.        Pharmacy where request should be sent: [facility] [address] [place] [address] [phone] [facility] [phone] FX     Additional details provided by patient: pt said that she is completely out, she scheduled an appt for next week , but is asking for a refill for at least 7 days to be sent to pharmacy.      Does the patient have less than a 3 day supply:  [x] Yes  [] No    Homa Arora Rep   02/11/22 14:41 CST       
Called Pt, she stated that she will complete annual labs on 2/17/22, can these be entered for Pt?  
Orders have been entered for provider to review/sign.   
Rx Refill Note  Requested Prescriptions     Pending Prescriptions Disp Refills   • DULoxetine (CYMBALTA) 30 MG capsule 30 capsule 5     Sig: Take 1 capsule by mouth Daily.      Last office visit with prescribing clinician: 3/19/21     Next office visit with prescribing clinician: 2/17/2022    Can 2/17/22 FU be changed to annual with labs prior since patient is overdue?      Adelaide Lr, MARIYA  02/11/22, 14:52 CST  
054

## 2022-02-17 DIAGNOSIS — F41.9 ANXIETY: ICD-10-CM

## 2022-02-17 RX ORDER — DULOXETIN HYDROCHLORIDE 30 MG/1
30 CAPSULE, DELAYED RELEASE ORAL DAILY
Qty: 30 CAPSULE | Refills: 0 | Status: CANCELLED | OUTPATIENT
Start: 2022-02-17

## 2022-02-17 NOTE — TELEPHONE ENCOUNTER
D  Caller: Radha Fernandes    Relationship: Self    Best call back number: [phone]  Requested Prescriptions:   Requested Prescriptions     Pending Prescriptions Disp Refills   • DULoxetine (CYMBALTA) 30 MG capsule 90 capsule 0     Sig: Take 1 capsule by mouth Daily.        Pharmacy where request should be sent: [facility] [address] [place] [address] [phone] [facility] [phone] FX     Additional details provided by patient: PATIENT IS HAVING CAR TROUBLE AND CANT MAKE IN TODAY WOULD LIKE AN EMERGENCY REFILL UNTIL NEXT APPOINTMENT. PLEASE CALL BACK AND LEAVE MESSAGE IF SHE DOESN'T ANSWER.    Does the patient have less than a 3 day supply:  [x] Yes  [] No    Homa Miller Rep   02/17/22 08:11 CST

## 2022-02-19 DIAGNOSIS — F41.9 ANXIETY: ICD-10-CM

## 2022-02-19 RX ORDER — DULOXETIN HYDROCHLORIDE 30 MG/1
30 CAPSULE, DELAYED RELEASE ORAL DAILY
Qty: 90 CAPSULE | Refills: 0 | Status: CANCELLED | OUTPATIENT
Start: 2022-02-19

## 2022-02-21 DIAGNOSIS — F41.9 ANXIETY: ICD-10-CM

## 2022-02-21 RX ORDER — DULOXETIN HYDROCHLORIDE 30 MG/1
30 CAPSULE, DELAYED RELEASE ORAL DAILY
Qty: 90 CAPSULE | Refills: 0 | Status: SHIPPED | OUTPATIENT
Start: 2022-02-21 | End: 2022-02-25 | Stop reason: SDUPTHER

## 2022-02-21 NOTE — TELEPHONE ENCOUNTER
Rx Refill Note  Requested Prescriptions      No prescriptions requested or ordered in this encounter      Last office visit with prescribing clinician: 2/23/2021      Next office visit with prescribing clinician: 02/25/2022    Kaitlyn Moreno MA  02/21/22, 09:31 CST

## 2022-02-25 ENCOUNTER — OFFICE VISIT (OUTPATIENT)
Dept: FAMILY MEDICINE CLINIC | Facility: CLINIC | Age: 27
End: 2022-02-25

## 2022-02-25 VITALS
BODY MASS INDEX: 17.14 KG/M2 | DIASTOLIC BLOOD PRESSURE: 71 MMHG | HEIGHT: 64 IN | OXYGEN SATURATION: 100 % | HEART RATE: 80 BPM | WEIGHT: 100.4 LBS | SYSTOLIC BLOOD PRESSURE: 114 MMHG | TEMPERATURE: 96.9 F

## 2022-02-25 DIAGNOSIS — J45.21 MILD INTERMITTENT ASTHMATIC BRONCHITIS WITH ACUTE EXACERBATION: ICD-10-CM

## 2022-02-25 DIAGNOSIS — F41.1 GENERALIZED ANXIETY DISORDER: Primary | ICD-10-CM

## 2022-02-25 DIAGNOSIS — F41.9 ANXIETY: ICD-10-CM

## 2022-02-25 DIAGNOSIS — R63.6 UNDERWEIGHT: ICD-10-CM

## 2022-02-25 PROCEDURE — 99395 PREV VISIT EST AGE 18-39: CPT | Performed by: FAMILY MEDICINE

## 2022-02-25 RX ORDER — DULOXETIN HYDROCHLORIDE 30 MG/1
30 CAPSULE, DELAYED RELEASE ORAL DAILY
Qty: 90 CAPSULE | Refills: 0 | Status: SHIPPED | OUTPATIENT
Start: 2022-02-25 | End: 2022-03-25 | Stop reason: SDUPTHER

## 2022-02-25 RX ORDER — DULOXETIN HYDROCHLORIDE 30 MG/1
CAPSULE, DELAYED RELEASE ORAL
Qty: 30 CAPSULE | Refills: 0 | OUTPATIENT
Start: 2022-02-25

## 2022-02-25 RX ORDER — BUSPIRONE HYDROCHLORIDE 10 MG/1
15 TABLET ORAL 2 TIMES DAILY
Qty: 90 TABLET | Refills: 5 | Status: SHIPPED | OUTPATIENT
Start: 2022-02-25 | End: 2022-03-25 | Stop reason: SDUPTHER

## 2022-02-25 NOTE — TELEPHONE ENCOUNTER
Can this medication be removed from this encounter? Medication was filled by provider today at visit.

## 2022-02-25 NOTE — PROGRESS NOTES
OFFICE VISIT NOTE:    Radha Fernandes is a 26 y.o. female who presents today for Annual Exam.     Here for annual exam - labs reviewed and discussed, and changes made appropriately. Any needed prescription refills were done. Health maintenance guidelines reviewed and recommended tests ordered as needed.     Buspar began about 2 years ago and now not noticing it to be as effective. Is taking 10mg BID routinely - is almost out of them. Will try to increase to 15mg BID for now but can adjust further if needed.     Was supposed to get labs yesterday but office was closed - will get another time.     Has intermittent asthma but not needing any inhalers or nebs now.        Past medical/surgical history, Family history, Social history, Allergies and Medications have been reviewed with the patient today and are updated in [facility] EMR. See below.  Past Medical History:   Diagnosis Date   • Anxiety    • Bronchitis      History reviewed. No pertinent surgical history.  Family History   Problem Relation Age of Onset   • Hypertension Mother    • Depression Mother    • Anxiety disorder Mother    • No Known Problems Father      Social History     Tobacco Use   • Smoking status: Never Smoker   • Smokeless tobacco: Never Used   Substance Use Topics   • Alcohol use: Yes     Comment: 2-3x a week per pt   • Drug use: Yes     Types: Marijuana       ALLERGIES:  Patient has no known allergies.    CURRENT MEDS:    Current Outpatient Medications:   •  busPIRone (BUSPAR) 10 MG tablet, Take 1.5 tablets by mouth 2 (Two) Times a Day., Disp: 90 tablet, Rfl: 5  •  DULoxetine (CYMBALTA) 30 MG capsule, Take 1 capsule by mouth Daily., Disp: 90 capsule, Rfl: 0    REVIEW OF SYSTEMS:  Review of Systems   Constitutional: Negative for activity change, fatigue, fever, unexpected weight gain and unexpected weight loss.   Respiratory: Negative for shortness of breath.    Cardiovascular: Negative for chest pain.   Gastrointestinal: Negative for abdominal pain.  "  Genitourinary: Negative for difficulty urinating.   Skin: Negative for rash.   Neurological: Negative for syncope and headache.       PHYSICAL EXAMINATION:  Vital Signs:  /71 (BP Location: Left arm, Patient Position: Sitting, Cuff Size: Pediatric)   Pulse 80   Temp 96.9 °F (36.1 °C) (Infrared)   Ht 162.6 cm (64\")   Wt 45.5 kg (100 lb 6.4 oz)   LMP 01/25/2021 (Approximate)   SpO2 100%   Breastfeeding No   BMI 17.23 kg/m²   Vitals:    02/25/22 1241   Patient Position: Sitting       Physical Exam  Vitals and nursing note reviewed.   Constitutional:       General: She is not in acute distress.     Appearance: She is well-developed.   HENT:      Head: Normocephalic and atraumatic.      Nose: Nose normal.      Mouth/Throat:      Mouth: Mucous membranes are moist.      Pharynx: Oropharynx is clear.   Eyes:      Extraocular Movements: Extraocular movements intact.      Pupils: Pupils are equal, round, and reactive to light.   Neck:      Vascular: No JVD.   Cardiovascular:      Rate and Rhythm: Normal rate and regular rhythm.      Pulses: Normal pulses.      Heart sounds: Normal heart sounds.   Pulmonary:      Effort: Pulmonary effort is normal. No respiratory distress.      Breath sounds: Normal breath sounds.   Abdominal:      General: Bowel sounds are normal. There is no distension.      Palpations: Abdomen is soft.      Tenderness: There is no abdominal tenderness.   Musculoskeletal:         General: Normal range of motion.      Cervical back: Normal range of motion and neck supple.      Right lower leg: No edema.      Left lower leg: No edema.   Skin:     General: Skin is warm and dry.      Capillary Refill: Capillary refill takes less than 2 seconds.      Findings: No rash.   Neurological:      General: No focal deficit present.      Mental Status: She is alert and oriented to person, place, and time.      Cranial Nerves: No cranial nerve deficit.   Psychiatric:         Mood and Affect: Mood normal.    "      Behavior: Behavior normal.         Procedures    ASSESSMENT/ PLAN:  Problem List Items Addressed This Visit        Mental Health    Anxiety    Relevant Medications    DULoxetine (CYMBALTA) 30 MG capsule      Other Visit Diagnoses     Generalized anxiety disorder    -  Primary    Relevant Medications    busPIRone (BUSPAR) 10 MG tablet    DULoxetine (CYMBALTA) 30 MG capsule    Underweight        Mild intermittent asthmatic bronchitis with acute exacerbation                Specific Patient Instructions:  MEDICATION Instructions: Encouraged patient to continue routine medicines as prescribed and maintain compliance. Patient instructed to report any adverse side effects or reactions to medicines promptly to the office. Patient instructed to make us aware of any OTC or herbal meds or supplement use.    DIET Recommendations: Patient instructed and provided information on the following nutrition and diet recommendations: Calorie restriction for weight reduction and maintenance. Necessity for adequate daily intake of fluids/water. Also, diet information provided in AVS for specifics.    EXERCISE Instructions: Discussed with patient the need for routine aerobic activity for cardiovascular fitness, 3 times a week for about 30 minutes. Daily exercise for increased fitness and weight reduction goals.    SMOKING Recommendations: Counseled patient and encouraged them on smoking and tobacco cessation if applicable. Discussed the benefits to all body systems with smoking/tobacco cessation, including decreased cardiac/lung/stroke/cancer risk. Encouraged no vaping use.    HEALTH MAINTENANCE:  Counseling provided to patient/family about routine health maintenance and ANNUAL physicals/labs. Counseling on recommended Vaccinations appropriate for age needed.        Patient's Body mass index is 17.23 kg/m². indicating that she is underweight (BMI < 18.5). Recommendations include: treating the underlying disease  process.      Medications or Orders placed this visit:  No orders of the defined types were placed in this encounter.      Medications DISCONTINUED this visit:  Medications Discontinued During This Encounter   Medication Reason   • Loryna 3-0.02 MG per tablet *Therapy completed   • albuterol (PROVENTIL) (2.5 MG/3ML) 0.083% nebulizer solution *Therapy completed   • albuterol sulfate  (90 Base) MCG/ACT inhaler *Therapy completed   • busPIRone (BUSPAR) 10 MG tablet Reorder   • DULoxetine (CYMBALTA) 30 MG capsule Reorder       FOLLOW-UP:  Return in about 4 weeks (around 3/25/2022) for Recheck, Next scheduled follow up.    I discussed the patients findings and my recommendations with patient.  An After Visit Summary (AVS) was printed and given to the patient at discharge.        Jason Murillo MD, FAAFP  2/25/2022

## 2022-03-02 LAB
25(OH)D3+25(OH)D2 SERPL-MCNC: 13.3 NG/ML (ref 30–100)
ALBUMIN SERPL-MCNC: 5 G/DL (ref 3.9–5)
ALBUMIN/GLOB SERPL: 2.1 {RATIO} (ref 1.2–2.2)
ALP SERPL-CCNC: 88 IU/L (ref 44–121)
ALT SERPL-CCNC: 12 IU/L (ref 0–32)
AST SERPL-CCNC: 13 IU/L (ref 0–40)
BASOPHILS # BLD AUTO: 0 X10E3/UL (ref 0–0.2)
BASOPHILS NFR BLD AUTO: 1 %
BILIRUB SERPL-MCNC: 0.5 MG/DL (ref 0–1.2)
BUN SERPL-MCNC: 11 MG/DL (ref 6–20)
BUN/CREAT SERPL: 15 (ref 9–23)
CALCIUM SERPL-MCNC: 9.9 MG/DL (ref 8.7–10.2)
CHLORIDE SERPL-SCNC: 100 MMOL/L (ref 96–106)
CHOLEST SERPL-MCNC: 147 MG/DL (ref 100–199)
CO2 SERPL-SCNC: 22 MMOL/L (ref 20–29)
CREAT SERPL-MCNC: 0.72 MG/DL (ref 0.57–1)
EGFR GENE MUT ANL BLD/T: 120 ML/MIN/1.73
EOSINOPHIL # BLD AUTO: 0.2 X10E3/UL (ref 0–0.4)
EOSINOPHIL NFR BLD AUTO: 5 %
ERYTHROCYTE [DISTWIDTH] IN BLOOD BY AUTOMATED COUNT: 12.8 % (ref 11.7–15.4)
GLOBULIN SER CALC-MCNC: 2.4 G/DL (ref 1.5–4.5)
GLUCOSE SERPL-MCNC: 89 MG/DL (ref 65–99)
HCT VFR BLD AUTO: 35.2 % (ref 34–46.6)
HCV AB S/CO SERPL IA: <0.1 S/CO RATIO (ref 0–0.9)
HDLC SERPL-MCNC: 43 MG/DL
HGB BLD-MCNC: 11.6 G/DL (ref 11.1–15.9)
IMM GRANULOCYTES # BLD AUTO: 0 X10E3/UL (ref 0–0.1)
IMM GRANULOCYTES NFR BLD AUTO: 0 %
LDLC SERPL CALC-MCNC: 90 MG/DL (ref 0–99)
LDLC/HDLC SERPL: 2.1 RATIO (ref 0–3.2)
LYMPHOCYTES # BLD AUTO: 2.2 X10E3/UL (ref 0.7–3.1)
LYMPHOCYTES NFR BLD AUTO: 43 %
MCH RBC QN AUTO: 30.1 PG (ref 26.6–33)
MCHC RBC AUTO-ENTMCNC: 33 G/DL (ref 31.5–35.7)
MCV RBC AUTO: 91 FL (ref 79–97)
MONOCYTES # BLD AUTO: 0.3 X10E3/UL (ref 0.1–0.9)
MONOCYTES NFR BLD AUTO: 7 %
NEUTROPHILS # BLD AUTO: 2.2 X10E3/UL (ref 1.4–7)
NEUTROPHILS NFR BLD AUTO: 44 %
PLATELET # BLD AUTO: 401 X10E3/UL (ref 150–450)
POTASSIUM SERPL-SCNC: 4.1 MMOL/L (ref 3.5–5.2)
PROT SERPL-MCNC: 7.4 G/DL (ref 6–8.5)
RBC # BLD AUTO: 3.86 X10E6/UL (ref 3.77–5.28)
SODIUM SERPL-SCNC: 137 MMOL/L (ref 134–144)
T4 SERPL-MCNC: 8.2 UG/DL (ref 4.5–12)
TRIGL SERPL-MCNC: 73 MG/DL (ref 0–149)
TSH SERPL DL<=0.005 MIU/L-ACNC: 0.7 UIU/ML (ref 0.45–4.5)
VLDLC SERPL CALC-MCNC: 14 MG/DL (ref 5–40)
WBC # BLD AUTO: 4.9 X10E3/UL (ref 3.4–10.8)

## 2022-03-25 ENCOUNTER — OFFICE VISIT (OUTPATIENT)
Dept: FAMILY MEDICINE CLINIC | Facility: CLINIC | Age: 27
End: 2022-03-25

## 2022-03-25 VITALS
SYSTOLIC BLOOD PRESSURE: 102 MMHG | WEIGHT: 98.9 LBS | HEART RATE: 73 BPM | TEMPERATURE: 97 F | OXYGEN SATURATION: 99 % | BODY MASS INDEX: 16.88 KG/M2 | DIASTOLIC BLOOD PRESSURE: 62 MMHG | HEIGHT: 64 IN

## 2022-03-25 DIAGNOSIS — F41.9 ANXIETY: ICD-10-CM

## 2022-03-25 DIAGNOSIS — F41.1 GENERALIZED ANXIETY DISORDER: ICD-10-CM

## 2022-03-25 PROCEDURE — 99213 OFFICE O/P EST LOW 20 MIN: CPT | Performed by: NURSE PRACTITIONER

## 2022-03-25 RX ORDER — BUSPIRONE HYDROCHLORIDE 10 MG/1
20 TABLET ORAL 2 TIMES DAILY PRN
Qty: 180 TABLET | Refills: 1 | Status: SHIPPED | OUTPATIENT
Start: 2022-03-25

## 2022-03-25 RX ORDER — DULOXETIN HYDROCHLORIDE 30 MG/1
30 CAPSULE, DELAYED RELEASE ORAL DAILY
Qty: 90 CAPSULE | Refills: 1 | Status: SHIPPED | OUTPATIENT
Start: 2022-03-25

## 2022-03-25 NOTE — PROGRESS NOTES
"Chief Complaint  Anxiety (Follow up on medication increase from last ov. Buspar was increased) and Results (Pt advised she had completed labs and no one has discussed results with her)    Subjective          Radha Fernandes presents to [facility]  History of Present Illness    Objective   Vital Signs:   /62 (BP Location: Left arm, Patient Position: Sitting, Cuff Size: Pediatric)   Pulse 73   Temp 97 °F (36.1 °C) (Infrared)   Ht 162.6 cm (64.02\")   Wt 44.9 kg (98 lb 14.4 oz)   SpO2 99%   BMI 16.97 kg/m²           Physical Exam   Result Review :                 Assessment and Plan    There are no diagnoses linked to this encounter.    Follow Up   No follow-ups on file.  Patient was given instructions and counseling regarding her condition or for health maintenance advice. Please see specific information pulled into the AVS if appropriate.       "